# Patient Record
Sex: MALE | Race: WHITE | NOT HISPANIC OR LATINO | Employment: OTHER | URBAN - METROPOLITAN AREA
[De-identification: names, ages, dates, MRNs, and addresses within clinical notes are randomized per-mention and may not be internally consistent; named-entity substitution may affect disease eponyms.]

---

## 2023-03-14 LAB
PROSTATE SPECIFIC AG (NG/ML) IN SER/PLAS: 6.7 NG/ML (ref 0–4)
PROSTATE SPECIFIC AG FREE (NG/ML) IN SER/PLAS: 0.9 NG/ML
PROSTATE SPECIFIC AG FREE/PROSTATE SPECIFIC AG TOTAL IN SER/PLAS: 13 %

## 2023-03-31 ENCOUNTER — TELEPHONE (OUTPATIENT)
Dept: PRIMARY CARE | Facility: CLINIC | Age: 83
End: 2023-03-31
Payer: MEDICARE

## 2023-03-31 NOTE — TELEPHONE ENCOUNTER
Pt. Called back he does see Dr. Askew cardiologist and already sees Dr. Cunha for his lungs, advised we would send reports to both and he will call them next week to see what they want him to do.

## 2023-03-31 NOTE — TELEPHONE ENCOUNTER
----- Message from Christopher Rosado MD sent at 3/29/2023  7:54 AM EDT -----  See if pt still seeing Dr. Askew, cardiologist.  Pt has very high calcium score and may need a stress test.  Dr. Askew would do that.     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 Mari Montoya pulmonary

## 2023-03-31 NOTE — TELEPHONE ENCOUNTER
Result Communication:  for pt. To call again.    Resulted Orders   CT cardiac scoring wo IV contrast    Narrative    Interpreted By:  TATIANA CARTER MD  MRN: [id number]  Patient Name: CHRISTIANE GUARDADO     STUDY:  CT CARDIAC SCORING;  3/28/2023 7:22 am     INDICATION:  hx osteopenia, prostate cancer  M85.80: Osteopenia Z13.6: Encounter  for screening for cardiovascular disorders.     COMPARISON:  None.     ACCESSION NUMBER(S):  [id number]     ORDERING CLINICIAN:  ASHLEY ROSADO     TECHNIQUE:  Using prospective ECG gating, CT scan of the coronary arteries was  performed without intravenous contrast. Coronary calcium scoring  was  performed according to the method of Agatston.     FINDINGS:  The score and distribution of calcium in the coronary arteries is as  follows:     LM 0,  .48.,  LCx 148.58,  .17.,     Total 1217.23     The visualized ascending thoracic aorta measures 4.0 cm in diameter.  The heart is normal in size. No pericardial effusion is present.     No gross evidence of mediastinal or hilar lymphadenopathy or masses  is identified. The visualized segments of the lungs demonstrates  extensive emphysema with subpleural reticulation with prominent  subpleural dystrophic/dendritic calcification..     The main pulmonary artery, right and left pulmonary artery are normal  in size.     The visualized subdiaphragmatic structures appear intact.       Impression    1. Coronary artery calcium score of 1217.23. Given patient's  extremely high coronary artery calcium score consider correlation  with physiologic or anatomic coronary artery testing.*. Extensive  emphysema with subpleural reticulation and calcification. This may  represent smoking-related fibrosis, correlate concern for chronic  interstitial fibrosing process such as fibrotic NSIP.  2. 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     COMMUNICATION  A critical result communication was sent to Dr. Rosado on  03/28/2023 at 8:56 p.m.  using the [employer] system.     *Coronary Artery  Agatston score     Score  risk  Very low 1-99  Mildly increased 100-299  Moderately increased >300  Moderate to severely increased >800     Yoel et al. JCCT 2016 (http://dx.doi.org/10.1016/j.jcct.2016.11.003)     STEVEN 10-Year CHD Risk with Coronary Artery Calcification can be  calcuate using link below  [email or web address]  Chastity et al. JACC 2015 (http://dx.doi.org/10.1016/j.j  acc.2015.08.035)       1:46 PM

## 2023-04-10 ENCOUNTER — TELEPHONE (OUTPATIENT)
Dept: PRIMARY CARE | Facility: CLINIC | Age: 83
End: 2023-04-10
Payer: MEDICARE

## 2023-04-10 ENCOUNTER — PATIENT OUTREACH (OUTPATIENT)
Dept: PRIMARY CARE | Facility: CLINIC | Age: 83
End: 2023-04-10
Payer: MEDICARE

## 2023-04-10 DIAGNOSIS — J18.9 COMMUNITY ACQUIRED PNEUMONIA, UNSPECIFIED LATERALITY: ICD-10-CM

## 2023-04-10 RX ORDER — DOFETILIDE 0.25 MG/1
250 CAPSULE ORAL 2 TIMES DAILY
COMMUNITY
End: 2023-09-27

## 2023-04-10 RX ORDER — LEVOFLOXACIN 750 MG/1
7 TABLET ORAL DAILY
COMMUNITY
Start: 2023-04-09 | End: 2023-06-06 | Stop reason: ALTCHOICE

## 2023-04-10 RX ORDER — ALBUTEROL SULFATE 90 UG/1
2 AEROSOL, METERED RESPIRATORY (INHALATION) EVERY 4 HOURS PRN
COMMUNITY
Start: 2021-11-09

## 2023-04-10 RX ORDER — APIXABAN 5 MG/1
5 TABLET, FILM COATED ORAL 2 TIMES DAILY
COMMUNITY

## 2023-04-10 RX ORDER — BENAZEPRIL HYDROCHLORIDE 5 MG/1
5 TABLET ORAL DAILY
COMMUNITY

## 2023-04-10 RX ORDER — PRIMIDONE 50 MG/1
50 TABLET ORAL 2 TIMES DAILY
COMMUNITY
Start: 2014-09-25 | End: 2024-01-02

## 2023-04-10 NOTE — TELEPHONE ENCOUNTER
SKIP from Pt :  Pt states he was in the hospital Friday and Saturday being treated for Pneumonia.  He was sent home with Levofloxacin, which will be finished on Saturday.

## 2023-04-10 NOTE — PROGRESS NOTES
Discharge Facility: [facility]  Discharge Diagnosis: CAP, Sepsis  Admission Date: 4/7/23  Discharge Date: 4/9/23    PCP appt: Not scheduled- will call back  Engagement  Call Start Time: 1500 (4/10/2023  3:18 PM)    Medications  Medications reviewed with patient/caregiver?: Yes (4/10/2023  3:18 PM)  Is the patient having any side effects they believe may be caused by any medication additions or changes?: No (4/10/2023  3:18 PM)  Does the patient have all medications ordered at discharge?: Yes (4/10/2023  3:18 PM)  Care Management Interventions: No intervention needed (4/10/2023  3:18 PM)  Is the patient taking all medications as directed (includes completed medication regime)?: Yes (4/10/2023  3:18 PM)  Care Management Interventions: Provided patient education (4/10/2023  3:18 PM)    Appointments  Does the patient have a primary care provider?: Yes (4/10/2023  3:18 PM)  Care Management Interventions: Educated patient on importance of making appointment; Advised patient to make appointment (4/10/2023  3:18 PM)  Has the patient kept scheduled appointments due by today?: Not applicable (4/10/2023  3:18 PM)  Care Management Interventions: Educated on importance of keeping appointment (4/10/2023  3:18 PM)  Follow Up Tasks: Appointments (4/10/2023  3:18 PM)    Self Management  What is the home health agency?: N/A (4/10/2023  3:18 PM)  Has home health visited the patient within 72 hours of discharge?: Not applicable (4/10/2023  3:18 PM)  What Durable Medical Equipment (DME) was ordered?: N/A (4/10/2023  3:18 PM)    Patient Teaching  Does the patient have access to their discharge instructions?: Yes (4/10/2023  3:18 PM)  Care Management Interventions: Reviewed instructions with patient (4/10/2023  3:18 PM)  What is the patient's perception of their health status since discharge?: Same (4/10/2023  3:18 PM)  Is the patient/caregiver able to teach back the hierarchy of who to call/visit for symptoms/problems? PCP, Specialist, Home  Health nurse, Urgent Care, ED, 911: Yes (4/10/2023  3:18 PM)    Wrap Up  Wrap Up Additional Comments: Patient states he is home and recovering well with no acute changes or concerns to report at this time. Patient has his wife present who assists with ADLs if needed. Patient denied need for DME or Home health services. Patient states he has no questions or concerns regarding his recent hospitalization or diagnosis'. (4/10/2023  3:18 PM)  Call End Time: 1514 (4/10/2023  3:18 PM)     Xena Lewis

## 2023-04-20 NOTE — TELEPHONE ENCOUNTER
LVMM for pt to call and update how he is feeling, and schedule follow-up next week for hospital stay.

## 2023-04-25 ENCOUNTER — PATIENT OUTREACH (OUTPATIENT)
Dept: PRIMARY CARE | Facility: CLINIC | Age: 83
End: 2023-04-25
Payer: MEDICARE

## 2023-04-25 DIAGNOSIS — J18.9 COMMUNITY ACQUIRED PNEUMONIA, UNSPECIFIED LATERALITY: ICD-10-CM

## 2023-04-25 NOTE — PROGRESS NOTES
Spoke to patient 14 days post discharge from the hospital on 4/9/23 for CAP. Patient states he is feeling well with no acute changes or concerns to report at this time. Patient states he had a stress test done yesterday and has a PFT soon. Patient did not follow up with his PCP within the 14 day window with an appt scheduled for 5/6/23. Patient denied any further questions or concerns at this time. Xena Lewis

## 2023-05-12 ENCOUNTER — PATIENT OUTREACH (OUTPATIENT)
Dept: PRIMARY CARE | Facility: CLINIC | Age: 83
End: 2023-05-12
Payer: MEDICARE

## 2023-05-12 DIAGNOSIS — J18.9 COMMUNITY ACQUIRED PNEUMONIA, UNSPECIFIED LATERALITY: ICD-10-CM

## 2023-06-06 ENCOUNTER — OFFICE VISIT (OUTPATIENT)
Dept: PRIMARY CARE | Facility: CLINIC | Age: 83
End: 2023-06-06
Payer: MEDICARE

## 2023-06-06 VITALS
DIASTOLIC BLOOD PRESSURE: 65 MMHG | SYSTOLIC BLOOD PRESSURE: 128 MMHG | BODY MASS INDEX: 31.95 KG/M2 | HEART RATE: 51 BPM | OXYGEN SATURATION: 97 % | HEIGHT: 74 IN | WEIGHT: 249 LBS

## 2023-06-06 DIAGNOSIS — M85.852 OSTEOPENIA OF NECK OF LEFT FEMUR: ICD-10-CM

## 2023-06-06 DIAGNOSIS — I10 BENIGN ESSENTIAL HYPERTENSION: ICD-10-CM

## 2023-06-06 DIAGNOSIS — G47.33 OBSTRUCTIVE SLEEP APNEA: Primary | ICD-10-CM

## 2023-06-06 DIAGNOSIS — H61.23 BILATERAL IMPACTED CERUMEN: ICD-10-CM

## 2023-06-06 DIAGNOSIS — I48.0 PAROXYSMAL ATRIAL FIBRILLATION (MULTI): ICD-10-CM

## 2023-06-06 PROBLEM — M85.80 OSTEOPENIA: Status: ACTIVE | Noted: 2023-06-06

## 2023-06-06 PROBLEM — K21.9 ESOPHAGEAL REFLUX: Status: ACTIVE | Noted: 2023-06-06

## 2023-06-06 PROBLEM — C61 PROSTATE CANCER (MULTI): Status: ACTIVE | Noted: 2023-06-06

## 2023-06-06 PROBLEM — J45.40 MODERATE PERSISTENT ASTHMA WITHOUT COMPLICATION (HHS-HCC): Status: ACTIVE | Noted: 2023-06-06

## 2023-06-06 PROBLEM — D64.9 CHRONIC ANEMIA: Status: ACTIVE | Noted: 2023-06-06

## 2023-06-06 PROBLEM — E78.2 COMBINED HYPERLIPIDEMIA: Status: ACTIVE | Noted: 2023-06-06

## 2023-06-06 PROBLEM — E53.8 VITAMIN B12 DEFICIENCY: Status: ACTIVE | Noted: 2023-06-06

## 2023-06-06 PROBLEM — E03.8 SUBCLINICAL HYPOTHYROIDISM: Status: ACTIVE | Noted: 2023-06-06

## 2023-06-06 PROBLEM — G25.0 BENIGN ESSENTIAL TREMOR: Status: ACTIVE | Noted: 2023-06-06

## 2023-06-06 PROCEDURE — 3074F SYST BP LT 130 MM HG: CPT | Performed by: FAMILY MEDICINE

## 2023-06-06 PROCEDURE — 99214 OFFICE O/P EST MOD 30 MIN: CPT | Performed by: FAMILY MEDICINE

## 2023-06-06 PROCEDURE — 3078F DIAST BP <80 MM HG: CPT | Performed by: FAMILY MEDICINE

## 2023-06-06 PROCEDURE — 1159F MED LIST DOCD IN RCRD: CPT | Performed by: FAMILY MEDICINE

## 2023-06-06 PROCEDURE — 1036F TOBACCO NON-USER: CPT | Performed by: FAMILY MEDICINE

## 2023-06-06 RX ORDER — BISACODYL 5 MG/1
1 TABLET, COATED ORAL DAILY
COMMUNITY
End: 2023-11-14 | Stop reason: SDUPTHER

## 2023-06-06 RX ORDER — FLUTICASONE PROPIONATE 50 MCG
1 SPRAY, SUSPENSION (ML) NASAL DAILY
COMMUNITY

## 2023-06-06 RX ORDER — SYRINGE-NEEDLE,INSULIN,0.5 ML 28GX1/2"
SYRINGE, EMPTY DISPOSABLE MISCELLANEOUS
COMMUNITY
Start: 2023-04-09 | End: 2023-11-14 | Stop reason: SDUPTHER

## 2023-06-06 RX ORDER — SILDENAFIL 25 MG/1
TABLET, FILM COATED ORAL
COMMUNITY
Start: 2021-11-09

## 2023-06-06 ASSESSMENT — ENCOUNTER SYMPTOMS
UNEXPECTED WEIGHT CHANGE: 0
VOMITING: 0
PALPITATIONS: 0
CONSTIPATION: 0

## 2023-06-06 NOTE — PROGRESS NOTES
"Subjective   Patient ID: Artur Rosa is a 82 y.o. male who presents for Follow-up (Thinks he may have a hernia mid abdomen/Can't bend down to pick things up hurts base of back and has pain in R buttocks, would like to see an orthopedist/Review bone density done in Dec. ).    HPI   Is finally over his pneumonia and very glad for it, got a good report from pulmonology yesterday  Concerned about lump in the middle of his stomach when he sits up has been present his whole life  Has a bump in the right buttock area that is only present when he first gets out of bed in the morning and is painful, also has trouble bending over to put tea in the ground when golfing  Concerned about his neck bone density and does not realize it is referring to the femoral neck  Review of Systems   Constitutional:  Negative for unexpected weight change.   HENT:  Negative for congestion and ear discharge.    Cardiovascular:  Negative for chest pain and palpitations.   Gastrointestinal:  Negative for constipation and vomiting.   All other systems reviewed and are negative.      Objective   /66   Pulse 51   Ht 1.88 m (6' 2\")   Wt 113 kg (249 lb)   SpO2 97%   BMI 31.97 kg/m²     Physical Exam  HENT:      Head: Normocephalic and atraumatic.      Nose: Nose normal.      Mouth/Throat:      Mouth: Mucous membranes are moist.      Pharynx: No oropharyngeal exudate.   Eyes:      Extraocular Movements: Extraocular movements intact.      Conjunctiva/sclera: Conjunctivae normal.      Pupils: Pupils are equal, round, and reactive to light.   Cardiovascular:      Rate and Rhythm: Normal rate and regular rhythm.   Pulmonary:      Effort: Pulmonary effort is normal.   Abdominal:      General: There is no distension.      Palpations: Abdomen is soft.   Musculoskeletal:      Cervical back: Normal range of motion and neck supple.   Lymphadenopathy:      Cervical: No cervical adenopathy.   Neurological:      General: No focal deficit present.      " Mental Status: He is alert.   Psychiatric:         Attention and Perception: Attention normal.         Speech: Speech normal.         Behavior: Behavior is cooperative.     Bilateral cerumen impaction with hearing aids and place, cerumen spoon and irrigated with subsequent exams normal    Assessment/Plan   Diagnoses and all orders for this visit:  Obstructive sleep apnea  Comments:  Continue to use CPAP  Benign essential hypertension  Comments:  Controlled  Paroxysmal atrial fibrillation (CMS/HCC)  Comments:  Rate controlled and anticoagulated  Osteopenia of neck of left femur  Comments:  Discussed location of femoral neck recommend vitamin D supplementation and regular exercise  Repeat bone scan in 2 years  Bilateral impacted cerumen  Comments:  Treated as above    Wife is present  Reviewed labs and DEXA scan result  Recheck 5 to 6 months sooner if any problems arise

## 2023-07-19 ENCOUNTER — OFFICE VISIT (OUTPATIENT)
Dept: PRIMARY CARE | Facility: CLINIC | Age: 83
End: 2023-07-19
Payer: MEDICARE

## 2023-07-19 VITALS
HEART RATE: 58 BPM | OXYGEN SATURATION: 97 % | SYSTOLIC BLOOD PRESSURE: 126 MMHG | TEMPERATURE: 98.2 F | DIASTOLIC BLOOD PRESSURE: 60 MMHG

## 2023-07-19 DIAGNOSIS — J44.1 COPD EXACERBATION (MULTI): Primary | ICD-10-CM

## 2023-07-19 DIAGNOSIS — I10 BENIGN ESSENTIAL HYPERTENSION: ICD-10-CM

## 2023-07-19 PROCEDURE — 1159F MED LIST DOCD IN RCRD: CPT | Performed by: FAMILY MEDICINE

## 2023-07-19 PROCEDURE — 1125F AMNT PAIN NOTED PAIN PRSNT: CPT | Performed by: FAMILY MEDICINE

## 2023-07-19 PROCEDURE — 1160F RVW MEDS BY RX/DR IN RCRD: CPT | Performed by: FAMILY MEDICINE

## 2023-07-19 PROCEDURE — 3078F DIAST BP <80 MM HG: CPT | Performed by: FAMILY MEDICINE

## 2023-07-19 PROCEDURE — 99213 OFFICE O/P EST LOW 20 MIN: CPT | Performed by: FAMILY MEDICINE

## 2023-07-19 PROCEDURE — 1036F TOBACCO NON-USER: CPT | Performed by: FAMILY MEDICINE

## 2023-07-19 PROCEDURE — 3074F SYST BP LT 130 MM HG: CPT | Performed by: FAMILY MEDICINE

## 2023-07-19 RX ORDER — MULTIVIT-MIN/FA/LYCOPEN/LUTEIN .4-300-25
1 TABLET ORAL DAILY
COMMUNITY
Start: 2014-03-31

## 2023-07-19 RX ORDER — PREDNISONE 10 MG/1
TABLET ORAL
Qty: 14 TABLET | Refills: 0 | Status: SHIPPED | OUTPATIENT
Start: 2023-07-19 | End: 2023-07-27

## 2023-07-19 RX ORDER — DOXYCYCLINE 100 MG/1
100 CAPSULE ORAL 2 TIMES DAILY
Qty: 14 CAPSULE | Refills: 0 | Status: SHIPPED | OUTPATIENT
Start: 2023-07-19 | End: 2023-07-26

## 2023-07-19 ASSESSMENT — ENCOUNTER SYMPTOMS
UNEXPECTED WEIGHT CHANGE: 0
CONSTIPATION: 0
PALPITATIONS: 0
VOMITING: 0

## 2023-07-19 NOTE — PROGRESS NOTES
Subjective   Patient ID: Artur Rosa is a 82 y.o. male who presents for chilled (Started last night, coughing since last week and feels tired, denies ST, fever or earaches, cough sounds deep and moist per wife mucus is white today. ).    HPI   No shortness of breath but not feeling very well this morning but feels better this afternoon  Sputum is white  Did smoke for 40 years and says he has lung damage at the base of each lung  Review of Systems   Constitutional:  Negative for unexpected weight change.   HENT:  Negative for congestion and ear discharge.    Cardiovascular:  Negative for chest pain and palpitations.   Gastrointestinal:  Negative for constipation and vomiting.   All other systems reviewed and are negative.      Objective   /60   Pulse 58   Temp 36.8 °C (98.2 °F)   SpO2 97%     Physical Exam  No apparent distress, alert and oriented.  HEENT TMs clear.  Nose mild nasal discharge.  Pharynx mild injection.  Neck supple, shoddy anterior lymphadenopathy.  Lungs clear to auscultation except slight rhonchi with cough.  Heart regular rate and rhythm .  Abdomen soft, nontender   Assessment/Plan   Diagnoses and all orders for this visit:  COPD exacerbation (CMS/[facility])  -     predniSONE (Deltasone) 10 mg tablet; Take 1 tablet (10 mg) by mouth 3 times a day for 2 days, THEN 1 tablet (10 mg) 2 times a day for 2 days, THEN 1 tablet (10 mg) once daily for 4 days.  -     doxycycline (Vibramycin) 100 mg capsule; Take 1 capsule (100 mg) by mouth 2 times a day for 7 days. Take with at least 8 ounces (large glass) of water, do not lie down for 30 minutes after  Benign essential hypertension  Comments:  controlled     Risks, benefits of medication discussed.  Add medications as directed.  Push fluids, supportive care.  Discussed diagnosis and treatment length . RTC 1 week if not improved, sooner if worse.   Wife is present

## 2023-07-24 ENCOUNTER — PATIENT OUTREACH (OUTPATIENT)
Dept: PRIMARY CARE | Facility: CLINIC | Age: 83
End: 2023-07-24
Payer: MEDICARE

## 2023-07-24 NOTE — PROGRESS NOTES
Unsuccessful outreach to this patient for the 90 day post discharge outreach. Left a voice-mail message including my direct call back number for the patient to call regarding any questions or concerns.  Patient has met target of no readmissions for 90 days and has graduated from [facility]

## 2023-09-06 LAB — PROSTATE SPECIFIC AG (NG/ML) IN SER/PLAS: 6.42 NG/ML (ref 0–4)

## 2023-09-27 DIAGNOSIS — I48.0 PAROXYSMAL ATRIAL FIBRILLATION (MULTI): Primary | ICD-10-CM

## 2023-09-27 PROBLEM — Z85.828 PERSONAL HISTORY OF OTHER MALIGNANT NEOPLASM OF SKIN: Status: ACTIVE | Noted: 2023-02-01

## 2023-09-27 PROBLEM — J44.1 COPD EXACERBATION (MULTI): Status: ACTIVE | Noted: 2023-09-27

## 2023-09-27 PROBLEM — Z85.820 PERSONAL HISTORY OF MALIGNANT MELANOMA OF SKIN: Status: ACTIVE | Noted: 2023-02-01

## 2023-09-27 PROBLEM — L82.0 INFLAMED SEBORRHEIC KERATOSIS: Status: ACTIVE | Noted: 2023-02-01

## 2023-09-27 PROBLEM — R97.20 ELEVATED PROSTATE SPECIFIC ANTIGEN (PSA): Status: ACTIVE | Noted: 2023-09-27

## 2023-09-27 PROBLEM — K22.4 ESOPHAGEAL DYSMOTILITY: Status: ACTIVE | Noted: 2023-09-27

## 2023-09-27 PROBLEM — L81.4 OTHER MELANIN HYPERPIGMENTATION: Status: ACTIVE | Noted: 2023-02-01

## 2023-09-27 PROBLEM — D22.30 MELANOCYTIC NEVI OF UNSPECIFIED PART OF FACE: Status: ACTIVE | Noted: 2023-02-01

## 2023-09-27 PROBLEM — L85.3 XEROSIS CUTIS: Status: ACTIVE | Noted: 2023-02-01

## 2023-09-27 PROBLEM — L82.1 OTHER SEBORRHEIC KERATOSIS: Status: ACTIVE | Noted: 2023-02-01

## 2023-09-27 PROBLEM — L91.8 OTHER HYPERTROPHIC DISORDERS OF THE SKIN: Status: ACTIVE | Noted: 2023-02-01

## 2023-09-27 PROBLEM — D22.70 MELANOCYTIC NEVI OF UNSPECIFIED LOWER LIMB, INCLUDING HIP: Status: ACTIVE | Noted: 2023-02-01

## 2023-09-27 PROBLEM — D22.5 MELANOCYTIC NEVI OF TRUNK: Status: ACTIVE | Noted: 2023-02-01

## 2023-09-27 PROBLEM — D23.9 OTHER BENIGN NEOPLASM OF SKIN, UNSPECIFIED: Status: ACTIVE | Noted: 2023-02-01

## 2023-09-27 PROBLEM — L73.8 OTHER SPECIFIED FOLLICULAR DISORDERS: Status: ACTIVE | Noted: 2023-02-01

## 2023-09-27 PROBLEM — L90.5 SCAR CONDITION AND FIBROSIS OF SKIN: Status: ACTIVE | Noted: 2023-02-01

## 2023-09-27 PROBLEM — D22.60 MELANOCYTIC NEVI OF UNSPECIFIED UPPER LIMB, INCLUDING SHOULDER: Status: ACTIVE | Noted: 2023-02-01

## 2023-09-27 PROBLEM — D18.01 HEMANGIOMA OF SKIN AND SUBCUTANEOUS TISSUE: Status: ACTIVE | Noted: 2023-02-01

## 2023-09-27 PROBLEM — C43.59 MALIGNANT MELANOMA OF OTHER PART OF TRUNK (MULTI): Status: ACTIVE | Noted: 2023-02-01

## 2023-09-27 PROBLEM — C44.41 BASAL CELL CARCINOMA OF SKIN OF SCALP AND NECK: Status: ACTIVE | Noted: 2023-02-01

## 2023-09-27 PROBLEM — N52.9 ED (ERECTILE DYSFUNCTION): Status: ACTIVE | Noted: 2023-09-27

## 2023-09-27 RX ORDER — NYSTATIN 100000 U/G
CREAM TOPICAL
COMMUNITY
Start: 2015-10-16 | End: 2024-02-19 | Stop reason: WASHOUT

## 2023-09-27 RX ORDER — DOFETILIDE 0.25 MG/1
CAPSULE ORAL 2 TIMES DAILY
Qty: 180 CAPSULE | Refills: 1 | Status: SHIPPED | OUTPATIENT
Start: 2023-09-27 | End: 2024-03-18

## 2023-09-27 RX ORDER — AMMONIUM LACTATE 12 G/100G
CREAM TOPICAL
COMMUNITY
Start: 2015-10-16 | End: 2024-02-19 | Stop reason: WASHOUT

## 2023-11-14 ENCOUNTER — LAB (OUTPATIENT)
Dept: LAB | Facility: LAB | Age: 83
End: 2023-11-14
Payer: MEDICARE

## 2023-11-14 ENCOUNTER — OFFICE VISIT (OUTPATIENT)
Dept: PRIMARY CARE | Facility: CLINIC | Age: 83
End: 2023-11-14
Payer: MEDICARE

## 2023-11-14 VITALS
DIASTOLIC BLOOD PRESSURE: 62 MMHG | WEIGHT: 251 LBS | BODY MASS INDEX: 32.21 KG/M2 | HEIGHT: 74 IN | OXYGEN SATURATION: 99 % | SYSTOLIC BLOOD PRESSURE: 120 MMHG | HEART RATE: 62 BPM

## 2023-11-14 DIAGNOSIS — E78.2 COMBINED HYPERLIPIDEMIA: ICD-10-CM

## 2023-11-14 DIAGNOSIS — I48.0 PAROXYSMAL ATRIAL FIBRILLATION (MULTI): ICD-10-CM

## 2023-11-14 DIAGNOSIS — I10 BENIGN ESSENTIAL HYPERTENSION: ICD-10-CM

## 2023-11-14 DIAGNOSIS — Z00.00 ROUTINE GENERAL MEDICAL EXAMINATION AT HEALTH CARE FACILITY: ICD-10-CM

## 2023-11-14 DIAGNOSIS — I10 BENIGN ESSENTIAL HYPERTENSION: Primary | ICD-10-CM

## 2023-11-14 LAB
ALBUMIN SERPL BCP-MCNC: 3.8 G/DL (ref 3.4–5)
ALP SERPL-CCNC: 56 U/L (ref 33–136)
ALT SERPL W P-5'-P-CCNC: 19 U/L (ref 10–52)
ANION GAP SERPL CALC-SCNC: 11 MMOL/L (ref 10–20)
AST SERPL W P-5'-P-CCNC: 19 U/L (ref 9–39)
BASOPHILS # BLD AUTO: 0.02 X10*3/UL (ref 0–0.1)
BASOPHILS NFR BLD AUTO: 0.4 %
BILIRUB SERPL-MCNC: 0.3 MG/DL (ref 0–1.2)
BUN SERPL-MCNC: 19 MG/DL (ref 6–23)
CALCIUM SERPL-MCNC: 8.4 MG/DL (ref 8.6–10.3)
CHLORIDE SERPL-SCNC: 105 MMOL/L (ref 98–107)
CHOLEST SERPL-MCNC: 143 MG/DL (ref 0–199)
CHOLESTEROL/HDL RATIO: 3.3
CO2 SERPL-SCNC: 28 MMOL/L (ref 21–32)
CREAT SERPL-MCNC: 1.14 MG/DL (ref 0.5–1.3)
EOSINOPHIL # BLD AUTO: 0.09 X10*3/UL (ref 0–0.4)
EOSINOPHIL NFR BLD AUTO: 1.6 %
ERYTHROCYTE [DISTWIDTH] IN BLOOD BY AUTOMATED COUNT: 13.7 % (ref 11.5–14.5)
GFR SERPL CREATININE-BSD FRML MDRD: 63 ML/MIN/1.73M*2
GLUCOSE SERPL-MCNC: 88 MG/DL (ref 74–99)
HCT VFR BLD AUTO: 36.8 % (ref 41–52)
HDLC SERPL-MCNC: 43.2 MG/DL
HGB BLD-MCNC: 11.8 G/DL (ref 13.5–17.5)
IMM GRANULOCYTES # BLD AUTO: 0.01 X10*3/UL (ref 0–0.5)
IMM GRANULOCYTES NFR BLD AUTO: 0.2 % (ref 0–0.9)
LYMPHOCYTES # BLD AUTO: 2.32 X10*3/UL (ref 0.8–3)
LYMPHOCYTES NFR BLD AUTO: 40.8 %
MCH RBC QN AUTO: 32.2 PG (ref 26–34)
MCHC RBC AUTO-ENTMCNC: 32.1 G/DL (ref 32–36)
MCV RBC AUTO: 101 FL (ref 80–100)
MONOCYTES # BLD AUTO: 0.65 X10*3/UL (ref 0.05–0.8)
MONOCYTES NFR BLD AUTO: 11.4 %
NEUTROPHILS # BLD AUTO: 2.6 X10*3/UL (ref 1.6–5.5)
NEUTROPHILS NFR BLD AUTO: 45.6 %
NON-HDL CHOLESTEROL: 100 MG/DL (ref 0–149)
NRBC BLD-RTO: 0 /100 WBCS (ref 0–0)
PLATELET # BLD AUTO: 181 X10*3/UL (ref 150–450)
POTASSIUM SERPL-SCNC: 4.2 MMOL/L (ref 3.5–5.3)
PROT SERPL-MCNC: 6.8 G/DL (ref 6.4–8.2)
RBC # BLD AUTO: 3.66 X10*6/UL (ref 4.5–5.9)
SODIUM SERPL-SCNC: 140 MMOL/L (ref 136–145)
WBC # BLD AUTO: 5.7 X10*3/UL (ref 4.4–11.3)

## 2023-11-14 PROCEDURE — G0439 PPPS, SUBSEQ VISIT: HCPCS | Performed by: FAMILY MEDICINE

## 2023-11-14 PROCEDURE — 99214 OFFICE O/P EST MOD 30 MIN: CPT | Performed by: FAMILY MEDICINE

## 2023-11-14 PROCEDURE — 1170F FXNL STATUS ASSESSED: CPT | Performed by: FAMILY MEDICINE

## 2023-11-14 PROCEDURE — 1160F RVW MEDS BY RX/DR IN RCRD: CPT | Performed by: FAMILY MEDICINE

## 2023-11-14 PROCEDURE — 3078F DIAST BP <80 MM HG: CPT | Performed by: FAMILY MEDICINE

## 2023-11-14 PROCEDURE — 1159F MED LIST DOCD IN RCRD: CPT | Performed by: FAMILY MEDICINE

## 2023-11-14 PROCEDURE — 1125F AMNT PAIN NOTED PAIN PRSNT: CPT | Performed by: FAMILY MEDICINE

## 2023-11-14 PROCEDURE — 3074F SYST BP LT 130 MM HG: CPT | Performed by: FAMILY MEDICINE

## 2023-11-14 PROCEDURE — 1036F TOBACCO NON-USER: CPT | Performed by: FAMILY MEDICINE

## 2023-11-14 PROCEDURE — 85025 COMPLETE CBC W/AUTO DIFF WBC: CPT

## 2023-11-14 PROCEDURE — 80053 COMPREHEN METABOLIC PANEL: CPT

## 2023-11-14 PROCEDURE — 36415 COLL VENOUS BLD VENIPUNCTURE: CPT

## 2023-11-14 PROCEDURE — 83718 ASSAY OF LIPOPROTEIN: CPT

## 2023-11-14 PROCEDURE — 82465 ASSAY BLD/SERUM CHOLESTEROL: CPT

## 2023-11-14 ASSESSMENT — ENCOUNTER SYMPTOMS
OCCASIONAL FEELINGS OF UNSTEADINESS: 0
DEPRESSION: 0
VOMITING: 0
PALPITATIONS: 0
LOSS OF SENSATION IN FEET: 0
CONSTIPATION: 0
UNEXPECTED WEIGHT CHANGE: 0

## 2023-11-14 ASSESSMENT — COLUMBIA-SUICIDE SEVERITY RATING SCALE - C-SSRS
1. IN THE PAST MONTH, HAVE YOU WISHED YOU WERE DEAD OR WISHED YOU COULD GO TO SLEEP AND NOT WAKE UP?: NO
2. HAVE YOU ACTUALLY HAD ANY THOUGHTS OF KILLING YOURSELF?: NO
6. HAVE YOU EVER DONE ANYTHING, STARTED TO DO ANYTHING, OR PREPARED TO DO ANYTHING TO END YOUR LIFE?: NO

## 2023-11-14 ASSESSMENT — ACTIVITIES OF DAILY LIVING (ADL)
TAKING_MEDICATION: INDEPENDENT
BATHING: INDEPENDENT
DRESSING: INDEPENDENT
GROCERY_SHOPPING: INDEPENDENT
MANAGING_FINANCES: INDEPENDENT
DOING_HOUSEWORK: INDEPENDENT

## 2023-11-14 ASSESSMENT — PATIENT HEALTH QUESTIONNAIRE - PHQ9
2. FEELING DOWN, DEPRESSED OR HOPELESS: NOT AT ALL
SUM OF ALL RESPONSES TO PHQ9 QUESTIONS 1 AND 2: 0
1. LITTLE INTEREST OR PLEASURE IN DOING THINGS: NOT AT ALL

## 2023-11-14 NOTE — PROGRESS NOTES
"Subjective   Patient ID: Artur Rosa is a 83 y.o. male who presents for Follow-up (5 month) and Medicare Annual Wellness Visit Initial.    HPI   Patient has hx of stable AF, hypertension, hyperlipidemia.  Pt denies chest pain, shortness of breath and edema.  Patient's current treatment as listed in Rx.  Patient is compliant with treatment and complains of no side effects associated treatment.    Review of Systems   Constitutional:  Negative for unexpected weight change.   HENT:  Negative for congestion and ear discharge.    Cardiovascular:  Negative for chest pain and palpitations.   Gastrointestinal:  Negative for constipation and vomiting.   All other systems reviewed and are negative.      Objective   /62   Pulse 62   Ht 1.88 m (6' 2\")   Wt 114 kg (251 lb)   SpO2 99%   BMI 32.23 kg/m²     Physical Exam  HENT:      Head: Normocephalic and atraumatic.      Nose: Nose normal.      Mouth/Throat:      Mouth: Mucous membranes are moist.      Pharynx: No oropharyngeal exudate.   Eyes:      Extraocular Movements: Extraocular movements intact.      Conjunctiva/sclera: Conjunctivae normal.      Pupils: Pupils are equal, round, and reactive to light.   Cardiovascular:      Rate and Rhythm: Normal rate and regular rhythm.   Pulmonary:      Effort: Pulmonary effort is normal.   Abdominal:      General: There is no distension.      Palpations: Abdomen is soft.   Musculoskeletal:      Cervical back: Normal range of motion and neck supple.   Lymphadenopathy:      Cervical: No cervical adenopathy.   Neurological:      General: No focal deficit present.      Mental Status: He is alert.   Psychiatric:         Attention and Perception: Attention normal.         Speech: Speech normal.         Behavior: Behavior is cooperative.         Assessment/Plan   Diagnoses and all orders for this visit:  Benign essential hypertension  Comments:  Treated and controlled  Orders:  -     Comprehensive Metabolic Panel; Future  -     " CBC and Auto Differential; Future  -     Lipid Panel Non-Fasting; Future  Combined hyperlipidemia  Comments:  Low-fat low-cholesterol diet and stay active  Orders:  -     Comprehensive Metabolic Panel; Future  -     CBC and Auto Differential; Future  -     Lipid Panel Non-Fasting; Future  Paroxysmal atrial fibrillation (CMS/HCC)  Comments:  Anticoagulated and rate is controlled  Orders:  -     Comprehensive Metabolic Panel; Future  -     CBC and Auto Differential; Future  -     Lipid Panel Non-Fasting; Future  Routine general medical examination at health care facility   LM discussed  Diet exercise and weight discussed  Wife is present  Recheck 6 months sooner if any issues arise

## 2023-11-16 ENCOUNTER — TELEPHONE (OUTPATIENT)
Dept: PRIMARY CARE | Facility: CLINIC | Age: 83
End: 2023-11-16
Payer: MEDICARE

## 2023-12-16 DIAGNOSIS — G25.0 ESSENTIAL TREMOR: ICD-10-CM

## 2024-01-02 RX ORDER — PRIMIDONE 50 MG/1
50 TABLET ORAL 2 TIMES DAILY
Qty: 180 TABLET | Refills: 1 | Status: SHIPPED | OUTPATIENT
Start: 2024-01-02

## 2024-02-19 ENCOUNTER — OFFICE VISIT (OUTPATIENT)
Dept: NEUROLOGY | Facility: CLINIC | Age: 84
End: 2024-02-19
Payer: MEDICARE

## 2024-02-19 VITALS
WEIGHT: 249 LBS | BODY MASS INDEX: 31.95 KG/M2 | HEART RATE: 56 BPM | SYSTOLIC BLOOD PRESSURE: 140 MMHG | DIASTOLIC BLOOD PRESSURE: 72 MMHG | HEIGHT: 74 IN

## 2024-02-19 DIAGNOSIS — G25.0 BENIGN ESSENTIAL TREMOR: Primary | ICD-10-CM

## 2024-02-19 PROCEDURE — 3077F SYST BP >= 140 MM HG: CPT | Performed by: PSYCHIATRY & NEUROLOGY

## 2024-02-19 PROCEDURE — 99213 OFFICE O/P EST LOW 20 MIN: CPT | Performed by: PSYCHIATRY & NEUROLOGY

## 2024-02-19 PROCEDURE — 1125F AMNT PAIN NOTED PAIN PRSNT: CPT | Performed by: PSYCHIATRY & NEUROLOGY

## 2024-02-19 PROCEDURE — 1036F TOBACCO NON-USER: CPT | Performed by: PSYCHIATRY & NEUROLOGY

## 2024-02-19 PROCEDURE — 3078F DIAST BP <80 MM HG: CPT | Performed by: PSYCHIATRY & NEUROLOGY

## 2024-02-19 PROCEDURE — 1159F MED LIST DOCD IN RCRD: CPT | Performed by: PSYCHIATRY & NEUROLOGY

## 2024-02-19 NOTE — PROGRESS NOTES
Subjective   Artur Rosa is a 83 y.o.   male.  HPI  This is a 83 year old man with BET, last seen in 12/15/22.  He is doing well, no adverse effects, no new medical problems: except a Podiatrist clips his   Toenails, states his toes are numb.  His right sided tremor is about the same.    Objective   Neurological Exam  Normal speech, follows commands.  Mild action tremor in the right upper extremity, normal muscle tone.  Gait is normal- with normal bilateral arm swing.  Physical Exam  I personally reviewed laboratory, radiographic, and medical studies which were pertinent for nothing.    Assessment/Plan

## 2024-03-04 ENCOUNTER — LAB (OUTPATIENT)
Dept: LAB | Facility: LAB | Age: 84
End: 2024-03-04
Payer: MEDICARE

## 2024-03-04 DIAGNOSIS — C61 MALIGNANT NEOPLASM OF PROSTATE (MULTI): Primary | ICD-10-CM

## 2024-03-04 LAB — PSA SERPL-MCNC: 5.79 NG/ML

## 2024-03-04 PROCEDURE — 36415 COLL VENOUS BLD VENIPUNCTURE: CPT

## 2024-03-04 PROCEDURE — 84153 ASSAY OF PSA TOTAL: CPT

## 2024-03-09 DIAGNOSIS — J45.40 MODERATE PERSISTENT ASTHMA, UNCOMPLICATED (HHS-HCC): ICD-10-CM

## 2024-03-11 RX ORDER — BUDESONIDE AND FORMOTEROL FUMARATE DIHYDRATE 80; 4.5 UG/1; UG/1
AEROSOL RESPIRATORY (INHALATION)
Qty: 30.6 G | Refills: 0 | Status: SHIPPED | OUTPATIENT
Start: 2024-03-11 | End: 2024-03-18

## 2024-03-13 ENCOUNTER — OFFICE VISIT (OUTPATIENT)
Dept: UROLOGY | Facility: CLINIC | Age: 84
End: 2024-03-13
Payer: MEDICARE

## 2024-03-13 DIAGNOSIS — R39.11 BENIGN PROSTATIC HYPERPLASIA WITH URINARY HESITANCY: ICD-10-CM

## 2024-03-13 DIAGNOSIS — C61 MALIGNANT NEOPLASM OF PROSTATE (MULTI): Primary | ICD-10-CM

## 2024-03-13 DIAGNOSIS — N40.1 BENIGN PROSTATIC HYPERPLASIA WITH URINARY HESITANCY: ICD-10-CM

## 2024-03-13 PROCEDURE — 1036F TOBACCO NON-USER: CPT | Performed by: STUDENT IN AN ORGANIZED HEALTH CARE EDUCATION/TRAINING PROGRAM

## 2024-03-13 PROCEDURE — 1159F MED LIST DOCD IN RCRD: CPT | Performed by: STUDENT IN AN ORGANIZED HEALTH CARE EDUCATION/TRAINING PROGRAM

## 2024-03-13 PROCEDURE — 99214 OFFICE O/P EST MOD 30 MIN: CPT | Performed by: STUDENT IN AN ORGANIZED HEALTH CARE EDUCATION/TRAINING PROGRAM

## 2024-03-13 RX ORDER — TAMSULOSIN HYDROCHLORIDE 0.4 MG/1
0.4 CAPSULE ORAL DAILY
Qty: 30 CAPSULE | Refills: 11 | Status: SHIPPED | OUTPATIENT
Start: 2024-03-13 | End: 2024-05-06 | Stop reason: SDUPTHER

## 2024-03-13 NOTE — PROGRESS NOTES
Subjective   Patient ID: Artur Rosa is a 83 y.o. male.    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 PSA now at 5.7.       He has urinary urgency and some leaking. Has frequency, sometimes has to go every hour. Had been on medication for  this in the past but does not recall name.      03/14/23 MRI prostate:  1. Prostate weight is estimated at 30.14g. PSA density is 0.22 ng/mL/g.  2. Previously seen PI-RADS 5 lesion in the left mid prostatic  peripheral zone has decreased in conspicuity. There is irregularity  of the left posterolateral prostatic capsule concerning for  extracapsular invasion.  3. PI-RADS 4 lesion in the right mid prostatic anterior transition  zone without evidence of extracapsular extension, similar to prior  exam.      Lab Results   Component Value Date    PSA 5.79 (H) 03/04/2024    PSA 6.42 (H) 09/05/2023    PSA 6.7 (H) 03/10/2023    PSA 4.97 (H) 09/06/2022    PSA 7.1 (H) 06/29/2022    PSA 5.8 (H) 03/07/2022    PSA 6.12 (H) 11/09/2021    PSA 4.5 (H) 05/11/2018       Review of Systems    Objective   Physical Exam    Assessment/Plan   82 yo male on active surveillance for GG1 prostatic adenocarcinoma, Ryan Score 3+3=6. PSA now at 5.7. We will continue observation with next PSA marker, and MRI prostate in 6 months.     MRI prostate from 1 year ago shows similar PI-RADS 4 lesion in the right mid prostatic anterior transition zone without evidence of extracapsular extension. Also, previously seen PI-RADS 5 lesion in the left mid prostatic peripheral zone has decreased in conspicuity. There is irregularity of the left posterolateral prostatic capsule concerning for  extracapsular invasion.      He has urinary urgency and some leaking. Has frequency, sometimes has to go every hour. Had been on medication for  this in the past but does not recall name. We will try tamsulosin. We discussed risks, benefits, alternatives and side effects. He agrees  to try.      He is on AS secondary to age, comorbidities, and biopsy of GG1 with a stable PSA. Reassured patient. Will repeat PSA in September 2024.     Plan:  PSA 6 months   MRI of prostate in 6 months.   Start tamsulosin 0.4  mg 1 pill daily at bedtime.   FUV 2 months to assess improvement of sxs on medication.     Diagnoses and all orders for this visit:  Malignant neoplasm of prostate (CMS/HCC)  Benign prostatic hyperplasia with urinary hesitancy      Scribe Attestation  By signing my name below, IRajni Scribe attest that this documentation has been prepared under the direction and in the presence of Vicki Howard MD.

## 2024-03-16 DIAGNOSIS — I48.0 PAROXYSMAL ATRIAL FIBRILLATION (MULTI): ICD-10-CM

## 2024-03-16 DIAGNOSIS — J45.40 MODERATE PERSISTENT ASTHMA, UNCOMPLICATED (HHS-HCC): ICD-10-CM

## 2024-03-18 RX ORDER — DOFETILIDE 0.25 MG/1
CAPSULE ORAL 2 TIMES DAILY
Qty: 180 CAPSULE | Refills: 1 | Status: SHIPPED | OUTPATIENT
Start: 2024-03-18

## 2024-03-18 RX ORDER — BUDESONIDE AND FORMOTEROL FUMARATE DIHYDRATE 80; 4.5 UG/1; UG/1
AEROSOL RESPIRATORY (INHALATION)
Qty: 10.2 G | Refills: 1 | Status: SHIPPED | OUTPATIENT
Start: 2024-03-18

## 2024-05-06 ENCOUNTER — OFFICE VISIT (OUTPATIENT)
Dept: UROLOGY | Facility: CLINIC | Age: 84
End: 2024-05-06
Payer: MEDICARE

## 2024-05-06 DIAGNOSIS — R39.11 BENIGN PROSTATIC HYPERPLASIA WITH URINARY HESITANCY: ICD-10-CM

## 2024-05-06 DIAGNOSIS — N40.1 BENIGN PROSTATIC HYPERPLASIA WITH URINARY HESITANCY: ICD-10-CM

## 2024-05-06 DIAGNOSIS — C61 MALIGNANT NEOPLASM OF PROSTATE (MULTI): ICD-10-CM

## 2024-05-06 DIAGNOSIS — N40.1 BENIGN PROSTATIC HYPERPLASIA WITH LOWER URINARY TRACT SYMPTOMS, SYMPTOM DETAILS UNSPECIFIED: Primary | ICD-10-CM

## 2024-05-06 PROCEDURE — 1159F MED LIST DOCD IN RCRD: CPT | Performed by: STUDENT IN AN ORGANIZED HEALTH CARE EDUCATION/TRAINING PROGRAM

## 2024-05-06 PROCEDURE — G2211 COMPLEX E/M VISIT ADD ON: HCPCS | Performed by: STUDENT IN AN ORGANIZED HEALTH CARE EDUCATION/TRAINING PROGRAM

## 2024-05-06 PROCEDURE — 99214 OFFICE O/P EST MOD 30 MIN: CPT | Performed by: STUDENT IN AN ORGANIZED HEALTH CARE EDUCATION/TRAINING PROGRAM

## 2024-05-06 RX ORDER — TAMSULOSIN HYDROCHLORIDE 0.4 MG/1
0.8 CAPSULE ORAL DAILY
Qty: 60 CAPSULE | Refills: 11 | Status: SHIPPED | OUTPATIENT
Start: 2024-05-06 | End: 2025-05-06

## 2024-05-06 NOTE — PROGRESS NOTES
Subjective   Patient ID: Artur Rosa is a 83 y.o. male.    HPI  84 yo male on active surveillance for GG1 prostatic adenocarcinoma, Ryan Score 3+3=6. PSA now at 5.7.      He has urinary urgency and some leaking. Has frequency, some improvement on the tamsulosin 0.4 mg but still room for improvement.      03/14/23 MRI prostate:  1. Prostate weight is estimated at 30.14g. PSA density is 0.22 ng/mL/g.  2. Previously seen PI-RADS 5 lesion in the left mid prostatic  peripheral zone has decreased in conspicuity. There is irregularity  of the left posterolateral prostatic capsule concerning for  extracapsular invasion.  3. PI-RADS 4 lesion in the right mid prostatic anterior transition  zone without evidence of extracapsular extension, similar to prior  exam.  Lab Results   Component Value Date    PSA 5.79 (H) 03/04/2024    PSA 6.42 (H) 09/05/2023    PSA 6.7 (H) 03/10/2023    PSA 4.97 (H) 09/06/2022    PSA 7.1 (H) 06/29/2022    PSA 5.8 (H) 03/07/2022    PSA 6.12 (H) 11/09/2021    PSA 4.5 (H) 05/11/2018       Review of Systems    Objective   Physical Exam    Assessment/Plan   84 yo male on active surveillance for GG1 prostatic adenocarcinoma, Maddock Score 3+3=6. PSA now at 5.7.      He has urinary urgency and some leaking. Has frequency, some improvement on the tamsulosin 0.4 mg but still room for improvement.     He is on primidone, interactions reviewed. We discussed increase tamsulosin to double dose. We discussed risks, benefits, alternatives and side effects. We will reassess sxs at next visit.     Plan:  PSA September 2024  Make tamsulosin 0.4  mg 2 pills daily at bedtime.  FUV September 2024.     Diagnoses and all orders for this visit:  Benign prostatic hyperplasia with lower urinary tract symptoms, symptom details unspecified  Malignant neoplasm of prostate (Multi)  Benign prostatic hyperplasia with urinary hesitancy  -     tamsulosin (Flomax) 0.4 mg 24 hr capsule; Take 2 capsules (0.8 mg) by mouth once  daily.    Scribe Attestation  By signing my name below, I, Huey Renae attest that this documentation has been prepared under the direction and in the presence of Vicki Howard MD.

## 2024-06-22 DIAGNOSIS — I48.0 PAROXYSMAL ATRIAL FIBRILLATION (MULTI): ICD-10-CM

## 2024-06-24 RX ORDER — DOFETILIDE 0.25 MG/1
CAPSULE ORAL 2 TIMES DAILY
Qty: 180 CAPSULE | Refills: 1 | Status: SHIPPED | OUTPATIENT
Start: 2024-06-24

## 2024-08-05 ENCOUNTER — HOSPITAL ENCOUNTER (OUTPATIENT)
Dept: RADIOLOGY | Facility: HOSPITAL | Age: 84
Discharge: HOME | End: 2024-08-05
Payer: MEDICARE

## 2024-08-05 DIAGNOSIS — C61 MALIGNANT NEOPLASM OF PROSTATE (MULTI): ICD-10-CM

## 2024-08-05 PROCEDURE — A9575 INJ GADOTERATE MEGLUMI 0.1ML: HCPCS | Performed by: STUDENT IN AN ORGANIZED HEALTH CARE EDUCATION/TRAINING PROGRAM

## 2024-08-05 PROCEDURE — 72197 MRI PELVIS W/O & W/DYE: CPT | Performed by: RADIOLOGY

## 2024-08-05 PROCEDURE — 72197 MRI PELVIS W/O & W/DYE: CPT

## 2024-08-05 PROCEDURE — 2550000001 HC RX 255 CONTRASTS: Performed by: STUDENT IN AN ORGANIZED HEALTH CARE EDUCATION/TRAINING PROGRAM

## 2024-08-05 RX ORDER — GADOTERATE MEGLUMINE 376.9 MG/ML
0.2 INJECTION INTRAVENOUS
Status: COMPLETED | OUTPATIENT
Start: 2024-08-05 | End: 2024-08-05

## 2024-08-05 ASSESSMENT — ENCOUNTER SYMPTOMS
OCCASIONAL FEELINGS OF UNSTEADINESS: 0
LOSS OF SENSATION IN FEET: 0
DEPRESSION: 0

## 2024-08-08 ENCOUNTER — LAB (OUTPATIENT)
Dept: LAB | Facility: LAB | Age: 84
End: 2024-08-08
Payer: MEDICARE

## 2024-08-08 ENCOUNTER — OFFICE VISIT (OUTPATIENT)
Dept: PRIMARY CARE | Facility: CLINIC | Age: 84
End: 2024-08-08
Payer: MEDICARE

## 2024-08-08 ENCOUNTER — TELEPHONE (OUTPATIENT)
Dept: PRIMARY CARE | Facility: CLINIC | Age: 84
End: 2024-08-08

## 2024-08-08 VITALS
OXYGEN SATURATION: 95 % | WEIGHT: 257 LBS | HEART RATE: 75 BPM | SYSTOLIC BLOOD PRESSURE: 120 MMHG | BODY MASS INDEX: 32.98 KG/M2 | DIASTOLIC BLOOD PRESSURE: 60 MMHG | TEMPERATURE: 97.9 F

## 2024-08-08 DIAGNOSIS — C61 MALIGNANT NEOPLASM OF PROSTATE (MULTI): ICD-10-CM

## 2024-08-08 DIAGNOSIS — J01.00 ACUTE MAXILLARY SINUSITIS, RECURRENCE NOT SPECIFIED: Primary | ICD-10-CM

## 2024-08-08 DIAGNOSIS — I10 BENIGN ESSENTIAL HYPERTENSION: ICD-10-CM

## 2024-08-08 PROCEDURE — 1160F RVW MEDS BY RX/DR IN RCRD: CPT | Performed by: FAMILY MEDICINE

## 2024-08-08 PROCEDURE — 36415 COLL VENOUS BLD VENIPUNCTURE: CPT

## 2024-08-08 PROCEDURE — 1159F MED LIST DOCD IN RCRD: CPT | Performed by: FAMILY MEDICINE

## 2024-08-08 PROCEDURE — 1036F TOBACCO NON-USER: CPT | Performed by: FAMILY MEDICINE

## 2024-08-08 PROCEDURE — 84153 ASSAY OF PSA TOTAL: CPT

## 2024-08-08 PROCEDURE — 3078F DIAST BP <80 MM HG: CPT | Performed by: FAMILY MEDICINE

## 2024-08-08 PROCEDURE — 3074F SYST BP LT 130 MM HG: CPT | Performed by: FAMILY MEDICINE

## 2024-08-08 PROCEDURE — 99213 OFFICE O/P EST LOW 20 MIN: CPT | Performed by: FAMILY MEDICINE

## 2024-08-08 RX ORDER — PREDNISONE 10 MG/1
TABLET ORAL
Qty: 14 TABLET | Refills: 0 | Status: SHIPPED | OUTPATIENT
Start: 2024-08-08 | End: 2024-08-15

## 2024-08-08 RX ORDER — DOXYCYCLINE 100 MG/1
100 TABLET ORAL 2 TIMES DAILY
Qty: 20 TABLET | Refills: 0 | Status: SHIPPED | OUTPATIENT
Start: 2024-08-08 | End: 2024-08-18

## 2024-08-08 ASSESSMENT — ENCOUNTER SYMPTOMS
ABDOMINAL DISTENTION: 0
COUGH: 1
WEAKNESS: 0
NAUSEA: 0
FEVER: 0
EYE PAIN: 0
SINUS PAIN: 0
VOMITING: 0
DYSURIA: 0
POLYDIPSIA: 0
FATIGUE: 0
HEADACHES: 0
SHORTNESS OF BREATH: 1
NUMBNESS: 0
TROUBLE SWALLOWING: 0
BACK PAIN: 0
DIZZINESS: 0
SORE THROAT: 0
EYE REDNESS: 0
ABDOMINAL PAIN: 0

## 2024-08-08 NOTE — TELEPHONE ENCOUNTER
PC from pt. [facility] is still closed due to power outage requesting his scripts be sent to [facility]. PC to [facility] left VM calling in Doxycycline and the Prednisone as written per Dr. Rosado today.

## 2024-08-08 NOTE — PROGRESS NOTES
Subjective   Patient ID: Artur Rosa is a 83 y.o. male who presents for Illness (Onset 8/3/24 with nasal congestion started Zyrtec is wheezing a little, but not coughing head felt very heavy no energy, nasal discharge is yellow, denies earaches HA ST or fever. ).    Patient states that he has been coughing up yellow sputum. He notes some mild SOB. Has been taking Zyrtec at home and believes it is improving his symptoms. Pt denies any HA, dizziness, abd pain, N/V/D, CP, fever, LE swelling    Illness  Associated symptoms include congestion, coughing and shortness of breath. Pertinent negatives include no eye pain, eye redness, headaches, sore throat, fatigue, fever, chest pain, abdominal pain, nausea, vomiting or rash.        Review of Systems   Constitutional:  Negative for fatigue and fever.   HENT:  Positive for congestion. Negative for sinus pain, sore throat and trouble swallowing.    Eyes:  Negative for pain, redness and visual disturbance.   Respiratory:  Positive for cough and shortness of breath.    Cardiovascular:  Negative for chest pain and leg swelling.   Gastrointestinal:  Negative for abdominal distention, abdominal pain, nausea and vomiting.   Endocrine: Negative for polydipsia and polyuria.   Genitourinary:  Negative for dysuria.   Musculoskeletal:  Negative for back pain.   Skin:  Negative for rash.   Neurological:  Negative for dizziness, syncope, weakness, numbness and headaches.   Psychiatric/Behavioral:  Negative for behavioral problems.        Objective   /60   Pulse 75   Temp 36.6 °C (97.9 °F)   Wt 117 kg (257 lb)   SpO2 95%   BMI 32.98 kg/m²     Physical Exam  Constitutional:       Appearance: Normal appearance. He is normal weight.   HENT:      Head: Normocephalic.      Right Ear: External ear normal.      Left Ear: External ear normal.      Nose: Nose normal.      Mouth/Throat:      Mouth: Mucous membranes are moist.   Eyes:      General: No scleral icterus.     Extraocular  Movements: Extraocular movements intact.   Cardiovascular:      Rate and Rhythm: Normal rate and regular rhythm.      Pulses: Normal pulses.      Heart sounds: Normal heart sounds.   Pulmonary:      Effort: Pulmonary effort is normal.      Breath sounds: Normal breath sounds. No stridor. No wheezing or rhonchi.   Chest:      Chest wall: No tenderness.   Abdominal:      General: Abdomen is flat.      Palpations: Abdomen is soft.   Musculoskeletal:         General: Normal range of motion.      Cervical back: Normal range of motion and neck supple.   Skin:     General: Skin is warm and dry.   Neurological:      General: No focal deficit present.      Mental Status: He is alert and oriented to person, place, and time.   Psychiatric:         Mood and Affect: Mood normal.         Assessment/Plan   Diagnoses and all orders for this visit:  Acute maxillary sinusitis, recurrence not specified  -     doxycycline (Adoxa) 100 mg tablet; Take 1 tablet (100 mg) by mouth 2 times a day for 10 days. Take with a full glass of water and do not lie down for at least 30 minutes after  -     predniSONE (Deltasone) 10 mg tablet; Take 1 tablet (10 mg) by mouth 3 times a day for 2 days, THEN 1 tablet (10 mg) 2 times a day for 2 days, THEN 1 tablet (10 mg) once daily for 4 days.  Benign essential hypertension  Treated and controlled  Risk benefits discussed and add medication as directed  Supportive care  RTC 1 week if not better sooner if worse

## 2024-08-09 ENCOUNTER — APPOINTMENT (OUTPATIENT)
Dept: PRIMARY CARE | Facility: CLINIC | Age: 84
End: 2024-08-09
Payer: MEDICARE

## 2024-08-09 DIAGNOSIS — J45.40 MODERATE PERSISTENT ASTHMA, UNCOMPLICATED (HHS-HCC): ICD-10-CM

## 2024-08-09 LAB — PSA SERPL-MCNC: 7.57 NG/ML

## 2024-08-09 RX ORDER — BUDESONIDE AND FORMOTEROL FUMARATE DIHYDRATE 80; 4.5 UG/1; UG/1
AEROSOL RESPIRATORY (INHALATION)
Qty: 10.2 G | Refills: 3 | Status: SHIPPED | OUTPATIENT
Start: 2024-08-09

## 2024-08-14 ENCOUNTER — APPOINTMENT (OUTPATIENT)
Dept: UROLOGY | Facility: CLINIC | Age: 84
End: 2024-08-14
Payer: MEDICARE

## 2024-08-14 DIAGNOSIS — R39.11 BENIGN PROSTATIC HYPERPLASIA WITH URINARY HESITANCY: ICD-10-CM

## 2024-08-14 DIAGNOSIS — C61 MALIGNANT NEOPLASM OF PROSTATE (MULTI): ICD-10-CM

## 2024-08-14 DIAGNOSIS — N40.1 BENIGN PROSTATIC HYPERPLASIA WITH URINARY HESITANCY: ICD-10-CM

## 2024-08-14 DIAGNOSIS — R35.0 BENIGN PROSTATIC HYPERPLASIA WITH URINARY FREQUENCY: Primary | ICD-10-CM

## 2024-08-14 DIAGNOSIS — N40.1 BENIGN PROSTATIC HYPERPLASIA WITH URINARY FREQUENCY: Primary | ICD-10-CM

## 2024-08-14 PROCEDURE — 99214 OFFICE O/P EST MOD 30 MIN: CPT | Performed by: STUDENT IN AN ORGANIZED HEALTH CARE EDUCATION/TRAINING PROGRAM

## 2024-08-14 PROCEDURE — G2211 COMPLEX E/M VISIT ADD ON: HCPCS | Performed by: STUDENT IN AN ORGANIZED HEALTH CARE EDUCATION/TRAINING PROGRAM

## 2024-08-14 NOTE — PROGRESS NOTES
Subjective   Patient ID: Artur Rosa is a 83 y.o. male.    HPI  84 yo male on active surveillance for GG1 prostatic adenocarcinoma, Ryan Score 3+3=6. PSA now at 7.5.      He is on double dose tamsulosin. Doing well on this, he is sleeping through the night. Comfortable with sxs management, no side effects. Some frequency during the day.     Lab Results   Component Value Date    PSA 7.57 (H) 08/08/2024    PSA 5.79 (H) 03/04/2024    PSA 6.42 (H) 09/05/2023    PSA 6.7 (H) 03/10/2023    PSA 4.97 (H) 09/06/2022    PSA 7.1 (H) 06/29/2022    PSA 5.8 (H) 03/07/2022    PSA 6.12 (H) 11/09/2021    PSA 4.5 (H) 05/11/2018     MR prostate with graciela boundaries if pirads 3 or above  Result Date: 8/6/2024    1.  PI-RADS 5 lesion in the left mid peripheral zone with capsular irregularity concerning for extracapsular extension, similar in size compared to prior prostate MRI on 03/23/2022. 2. PI-RADS 4 lesion in the right anterior peripheral zone zone without evidence of extracapsular extension, similar to prior exam.   PI-RADS 5 - Very high (clinically significant cancer is highly likely to be present).         03/14/23 MRI prostate:  1. Prostate weight is estimated at 30.14g. PSA density is 0.22 ng/mL/g.  2. Previously seen PI-RADS 5 lesion in the left mid prostatic  peripheral zone has decreased in conspicuity. There is irregularity  of the left posterolateral prostatic capsule concerning for  extracapsular invasion.  3. PI-RADS 4 lesion in the right mid prostatic anterior transition  zone without evidence of extracapsular extension, similar to prior  exam.    Review of Systems    Objective   Physical Exam    Assessment/Plan   84 yo male on active surveillance for GG1 prostatic adenocarcinoma, Ryan Score 3+3=6. PSA now at 7.5.      He is on double dose tamsulosin. Doing well on this, he is sleeping through the night. Comfortable with sxs management, no side effects. Some frequency during the day.     I personally reviewed  the MRI images as well as report. PI-RADS 5 lesion in the left mid peripheral zone with capsular irregularity concerning for extracapsular extension, similar in size compared to prior prostate MRI on 03/23/2022. PI-RADS 4 lesion in the right anterior peripheral zone zone without evidence of extracapsular extension, similar to prior exam. PI-RADS 5.     We discussed for now, it is OK to continue PSA surveillance. There is no evidence this cancer is progressing fast, and due to comorbidity and age, more compelling evidence is needed (like a significant jump in the PSA) to be comfortable with proceeding with biopsy. Will repeat PSA in December 2024.     Plan:  PSA December 2024.   FUV December 2024 after PSA.   Continue tamsulosin 0.4  mg 2 pills daily at bedtime.     Diagnoses and all orders for this visit:  Benign prostatic hyperplasia with urinary frequency  Malignant neoplasm of prostate (Multi)  -     PSA; Future  Benign prostatic hyperplasia with urinary hesitancy  -     tamsulosin (Flomax) 0.4 mg 24 hr capsule; Take 2 capsules (0.8 mg) by mouth once daily.    Scribe Attestation  By signing my name below, IRajni, Scribe attest that this documentation has been prepared under the direction and in the presence of Vicki Howard MD.

## 2024-08-15 RX ORDER — TAMSULOSIN HYDROCHLORIDE 0.4 MG/1
0.8 CAPSULE ORAL DAILY
Qty: 60 CAPSULE | Refills: 11 | Status: SHIPPED | OUTPATIENT
Start: 2024-08-15 | End: 2025-08-15

## 2024-08-23 DIAGNOSIS — G25.0 ESSENTIAL TREMOR: ICD-10-CM

## 2024-08-23 RX ORDER — PRIMIDONE 50 MG/1
50 TABLET ORAL 2 TIMES DAILY
Qty: 180 TABLET | Refills: 1 | Status: SHIPPED | OUTPATIENT
Start: 2024-08-23

## 2024-09-16 ENCOUNTER — APPOINTMENT (OUTPATIENT)
Dept: UROLOGY | Facility: CLINIC | Age: 84
End: 2024-09-16
Payer: MEDICARE

## 2024-11-14 ENCOUNTER — LAB (OUTPATIENT)
Dept: LAB | Facility: LAB | Age: 84
End: 2024-11-14
Payer: MEDICARE

## 2024-11-14 ENCOUNTER — APPOINTMENT (OUTPATIENT)
Dept: NEUROLOGY | Facility: CLINIC | Age: 84
End: 2024-11-14
Payer: MEDICARE

## 2024-11-14 VITALS
SYSTOLIC BLOOD PRESSURE: 138 MMHG | HEIGHT: 74 IN | BODY MASS INDEX: 33.37 KG/M2 | WEIGHT: 260 LBS | DIASTOLIC BLOOD PRESSURE: 72 MMHG | HEART RATE: 67 BPM

## 2024-11-14 DIAGNOSIS — G25.0 BENIGN ESSENTIAL TREMOR: Primary | ICD-10-CM

## 2024-11-14 DIAGNOSIS — C61 MALIGNANT NEOPLASM OF PROSTATE (MULTI): ICD-10-CM

## 2024-11-14 PROCEDURE — 99212 OFFICE O/P EST SF 10 MIN: CPT | Performed by: PSYCHIATRY & NEUROLOGY

## 2024-11-14 PROCEDURE — 1036F TOBACCO NON-USER: CPT | Performed by: PSYCHIATRY & NEUROLOGY

## 2024-11-14 PROCEDURE — 36415 COLL VENOUS BLD VENIPUNCTURE: CPT

## 2024-11-14 PROCEDURE — 3078F DIAST BP <80 MM HG: CPT | Performed by: PSYCHIATRY & NEUROLOGY

## 2024-11-14 PROCEDURE — 84153 ASSAY OF PSA TOTAL: CPT

## 2024-11-14 PROCEDURE — 3075F SYST BP GE 130 - 139MM HG: CPT | Performed by: PSYCHIATRY & NEUROLOGY

## 2024-11-14 PROCEDURE — 1159F MED LIST DOCD IN RCRD: CPT | Performed by: PSYCHIATRY & NEUROLOGY

## 2024-11-14 NOTE — PROGRESS NOTES
Subjective   Artur Rosa is a 84 y.o.   male.  HPI  This is a 84 year old man, with his son, present, here for BET, last seen in 2/19/2024.  He has stable right sided BET.  Objective   Neurological Exam  Right arm is quite steady in the outstretched position.  Gait is normal, speech is normal.  Physical Exam  I personally reviewed laboratory, radiographic, and medical studies which were pertinent for nothing.    Assessment/Plan

## 2024-11-14 NOTE — PATIENT INSTRUCTIONS
You are stable on the primidone 50 mg twice a day, continue monitoring it and call the office if getting worse.  I do not think changing the dosing or try another medication at this time.  Follow up in one year.

## 2024-11-15 LAB — PSA SERPL-MCNC: 7.44 NG/ML

## 2024-11-16 ENCOUNTER — TELEPHONE (OUTPATIENT)
Dept: PRIMARY CARE | Facility: CLINIC | Age: 84
End: 2024-11-16
Payer: MEDICARE

## 2024-11-16 DIAGNOSIS — J44.1 COPD EXACERBATION (MULTI): ICD-10-CM

## 2024-11-16 RX ORDER — ALBUTEROL SULFATE 90 UG/1
2 INHALANT RESPIRATORY (INHALATION) EVERY 4 HOURS PRN
Qty: 18 G | Refills: 0 | Status: SHIPPED | OUTPATIENT
Start: 2024-11-16 | End: 2025-02-14

## 2024-11-16 NOTE — TELEPHONE ENCOUNTER
MEDICATION REFILL    Pharmacy Name:   [facility] [address]  Medication requested            Albuterol     90 mcg/actuation inhaler  Dosage    2 puffs every 4 hours prn   Quantity           90 days    Allergies      none given  Date of last visit   08/08/2024  Date of Next Appointment [  ]

## 2024-12-07 DIAGNOSIS — J44.1 COPD EXACERBATION (MULTI): ICD-10-CM

## 2024-12-10 NOTE — PROGRESS NOTES
Subjective   Patient ID: Artur Rosa is a 84 y.o. male. He is prescribed tamsulosin 0.4 mg 2 pills at bedtime.       HPI  84 y.o. male on active surveillance for GG1 prostatic adenocarcinoma, [place] Score 3+3=6. Recent PSA was 7.44, decreased slightly from 7.57.    He is not experiencing increased urinary symptoms at this time.     Lab Results   Component Value Date    PSA 7.44 (H) 11/14/2024    PSA 7.57 (H) 08/08/2024    PSA 5.79 (H) 03/04/2024    PSA 6.42 (H) 09/05/2023    PSA 6.7 (H) 03/10/2023      MR PROSTATE WITH LAYTON BOUNDARIES IF PIRADS 3 OR ABOVE; 8/5/2024   IMPRESSION:  1.  PI-RADS 5 lesion in the left mid peripheral zone with capsular  irregularity concerning for extracapsular extension, similar in size  compared to prior prostate MRI on 03/23/2022.  2. PI-RADS 4 lesion in the right anterior peripheral zone zone  without evidence of extracapsular extension, similar to prior exam.      Review of Systems  A complete review of systems was performed. All systems are noted to be negative unless indicated in the history of present illness, impression, active problem list, or past histories.     Objective   Physical Exam    Assessment/Plan   Diagnoses and all orders for this visit:  Malignant neoplasm of prostate (Multi)  -     Prostate Specific Antigen; Future  Benign prostatic hyperplasia with urinary frequency      84 y.o. male on active surveillance for GG1 prostatic adenocarcinoma, Ryan Score 3+3=6. Recent PSA was 7.44, decreased slightly from 7.57. He is prescribed tamsulosin 0.4 mg 2 pills at bedtime.     I personally reviewed the MRI of the prostate which demonstrates 2 separate lesions.     His cancer is not that active, as the PSA has not doubled in one year. It is a concern that if we treat the patient that he would have great effects from the treatment. The patient is currently not experiencing side effects of treatment because he is not undergoing treatment. I expressed that the treatment  options depend on the PSA level and symptoms.     We are in stable condition. We will continue with active surveillance. The patient will be monitored every 6 months with PSA testing and follow up visits.     Plan:  Continue tamsulosin 0.4  mg 2 pills daily at bedtime.   PSA 05/2025  FUV 06/2025      Scribe Attestation   By signing my name below, IIsidoro, Scribe attestation that this documentation has been prepared under the direction and in the presence of Vicki Howard MD.

## 2024-12-10 NOTE — TELEPHONE ENCOUNTER
Patient did not wish to make an appointment at this time            [General Appearance - Well Developed] : well developed [Well Groomed] : well groomed [Normal Appearance] : normal appearance [No Deformities] : no deformities [General Appearance - Well Nourished] : well nourished [General Appearance - In No Acute Distress] : no acute distress [Normal Conjunctiva] : the conjunctiva exhibited no abnormalities [Eyelids - No Xanthelasma] : the eyelids demonstrated no xanthelasmas [Normal Oropharynx] : normal oropharynx [Neck Appearance] : the appearance of the neck was normal [Neck Cervical Mass (___cm)] : no neck mass was observed [Jugular Venous Distention Increased] : there was no jugular-venous distention [Thyroid Diffuse Enlargement] : the thyroid was not enlarged [Murmurs] : no murmurs present [Thyroid Nodule] : there were no palpable thyroid nodules [Heart Sounds] : normal S1 and S2 [Heart Rate And Rhythm] : heart rate and rhythm were normal [Respiration, Rhythm And Depth] : normal respiratory rhythm and effort [Exaggerated Use Of Accessory Muscles For Inspiration] : no accessory muscle use [Surgical scars] : surgical scars [Kyphosis] : kyphosis [Abdomen Soft] : soft [Abdomen Tenderness] : non-tender [Abdomen Mass (___ Cm)] : no abdominal mass palpated [Gait - Sufficient For Exercise Testing] : the gait was sufficient for exercise testing [Abnormal Walk] : normal gait [Cyanosis, Localized] : no localized cyanosis [Petechial Hemorrhages (___cm)] : no petechial hemorrhages [Nail Clubbing] : no clubbing of the fingernails [No Skin Ulcers] : no skin ulcer [Skin Lesions] : no skin lesions [No Xanthoma] : no  xanthoma was observed [No Venous Stasis] : no venous stasis [No Focal Deficits] : no focal deficits [Deep Tendon Reflexes (DTR)] : deep tendon reflexes were 2+ and symmetric [Sensation] : the sensory exam was normal to light touch and pinprick [Skin Color & Pigmentation] : normal skin color and pigmentation [] : no rash [Skin Turgor] : normal skin turgor [FreeTextEntry1] : scattered wheezes bilaterally

## 2024-12-11 ENCOUNTER — APPOINTMENT (OUTPATIENT)
Dept: UROLOGY | Facility: CLINIC | Age: 84
End: 2024-12-11
Payer: MEDICARE

## 2024-12-11 DIAGNOSIS — N40.1 BENIGN PROSTATIC HYPERPLASIA WITH URINARY FREQUENCY: ICD-10-CM

## 2024-12-11 DIAGNOSIS — R35.0 BENIGN PROSTATIC HYPERPLASIA WITH URINARY FREQUENCY: ICD-10-CM

## 2024-12-11 DIAGNOSIS — C61 MALIGNANT NEOPLASM OF PROSTATE (MULTI): Primary | ICD-10-CM

## 2024-12-11 PROCEDURE — 99213 OFFICE O/P EST LOW 20 MIN: CPT | Performed by: STUDENT IN AN ORGANIZED HEALTH CARE EDUCATION/TRAINING PROGRAM

## 2024-12-11 PROCEDURE — G2211 COMPLEX E/M VISIT ADD ON: HCPCS | Performed by: STUDENT IN AN ORGANIZED HEALTH CARE EDUCATION/TRAINING PROGRAM

## 2024-12-21 DIAGNOSIS — I48.0 PAROXYSMAL ATRIAL FIBRILLATION (MULTI): ICD-10-CM

## 2024-12-23 RX ORDER — DOFETILIDE 0.25 MG/1
CAPSULE ORAL 2 TIMES DAILY
Qty: 180 CAPSULE | Refills: 1 | Status: SHIPPED | OUTPATIENT
Start: 2024-12-23

## 2025-01-08 RX ORDER — ALBUTEROL SULFATE 90 UG/1
2 INHALANT RESPIRATORY (INHALATION) EVERY 4 HOURS PRN
Qty: 8 G | Refills: 0 | Status: SHIPPED | OUTPATIENT
Start: 2025-01-08 | End: 2025-04-08

## 2025-02-17 ENCOUNTER — OFFICE VISIT (OUTPATIENT)
Dept: PRIMARY CARE | Facility: CLINIC | Age: 85
End: 2025-02-17
Payer: MEDICARE

## 2025-02-17 VITALS
WEIGHT: 255.6 LBS | OXYGEN SATURATION: 99 % | SYSTOLIC BLOOD PRESSURE: 152 MMHG | TEMPERATURE: 98.1 F | BODY MASS INDEX: 32.82 KG/M2 | HEART RATE: 59 BPM | DIASTOLIC BLOOD PRESSURE: 58 MMHG

## 2025-02-17 DIAGNOSIS — D03.30 MELANOMA IN SITU OF FACE (MULTI): ICD-10-CM

## 2025-02-17 DIAGNOSIS — I48.91 ATRIAL FIBRILLATION, UNSPECIFIED TYPE (MULTI): ICD-10-CM

## 2025-02-17 DIAGNOSIS — K57.32 DIVERTICULITIS OF LARGE INTESTINE WITHOUT PERFORATION OR ABSCESS WITHOUT BLEEDING: Primary | ICD-10-CM

## 2025-02-17 PROBLEM — J44.1 COPD EXACERBATION (MULTI): Status: RESOLVED | Noted: 2023-09-27 | Resolved: 2025-02-17

## 2025-02-17 PROCEDURE — 3078F DIAST BP <80 MM HG: CPT | Performed by: FAMILY MEDICINE

## 2025-02-17 PROCEDURE — G2211 COMPLEX E/M VISIT ADD ON: HCPCS | Performed by: FAMILY MEDICINE

## 2025-02-17 PROCEDURE — 1036F TOBACCO NON-USER: CPT | Performed by: FAMILY MEDICINE

## 2025-02-17 PROCEDURE — 3077F SYST BP >= 140 MM HG: CPT | Performed by: FAMILY MEDICINE

## 2025-02-17 PROCEDURE — 1159F MED LIST DOCD IN RCRD: CPT | Performed by: FAMILY MEDICINE

## 2025-02-17 PROCEDURE — 99213 OFFICE O/P EST LOW 20 MIN: CPT | Performed by: FAMILY MEDICINE

## 2025-02-17 RX ORDER — AMOXICILLIN AND CLAVULANATE POTASSIUM 875; 125 MG/1; MG/1
875 TABLET, FILM COATED ORAL 2 TIMES DAILY
Qty: 14 TABLET | Refills: 0 | Status: SHIPPED | OUTPATIENT
Start: 2025-02-17 | End: 2025-02-24

## 2025-02-17 ASSESSMENT — ENCOUNTER SYMPTOMS
UNEXPECTED WEIGHT CHANGE: 0
VOMITING: 0
CONSTIPATION: 0
PALPITATIONS: 0

## 2025-02-17 ASSESSMENT — PATIENT HEALTH QUESTIONNAIRE - PHQ9
1. LITTLE INTEREST OR PLEASURE IN DOING THINGS: NOT AT ALL
SUM OF ALL RESPONSES TO PHQ9 QUESTIONS 1 AND 2: 0
2. FEELING DOWN, DEPRESSED OR HOPELESS: NOT AT ALL

## 2025-02-17 NOTE — PROGRESS NOTES
Subjective   Patient ID: Artur Rosa is a 84 y.o. male who presents for stomach upset and diarrhea (Pt has had diarrhea since 2/10/25 and upset stomach.  Every time he eats is having diarrhea.  Not going a much because he isn't eating as much.  Pt tried imodium and it may have helped some.  Pt is wearing depends because it is hard to get to the bathroom.  Pt just returned from [place] on 2/11/25.  ).    HPI   Complains of lower abdominal pain  No fever chills  Watery stool  Did not eat or drink anything he thinks is suspicious  Wife is not sick  Does have known history of diverticulosis    Review of Systems   Constitutional:  Negative for unexpected weight change.   HENT:  Negative for congestion and ear discharge.    Cardiovascular:  Negative for chest pain and palpitations.   Gastrointestinal:  Negative for constipation and vomiting.       Objective   /58   Pulse 59   Temp 36.7 °C (98.1 °F) (Temporal)   Wt 116 kg (255 lb 9.6 oz)   SpO2 99%   BMI 32.82 kg/m²     Physical Exam  HENT:      Head: Normocephalic and atraumatic.      Nose: Nose normal.      Mouth/Throat:      Mouth: Mucous membranes are moist.      Pharynx: No oropharyngeal exudate.   Eyes:      Extraocular Movements: Extraocular movements intact.      Conjunctiva/sclera: Conjunctivae normal.      Pupils: Pupils are equal, round, and reactive to light.   Cardiovascular:      Rate and Rhythm: Normal rate and regular rhythm.   Pulmonary:      Effort: Pulmonary effort is normal.      Breath sounds: Normal breath sounds.   Abdominal:      General: Bowel sounds are normal. There is no distension.      Palpations: Abdomen is soft.          Comments: Sl tenderness suprapubic area   Musculoskeletal:      Cervical back: Normal range of motion and neck supple.   Lymphadenopathy:      Cervical: No cervical adenopathy.   Neurological:      General: No focal deficit present.      Mental Status: He is alert.   Psychiatric:         Attention and  Perception: Attention normal.         Speech: Speech normal.         Behavior: Behavior is cooperative.         Assessment/Plan   Problem List Items Addressed This Visit    None  Visit Diagnoses         Codes    Diverticulitis of large intestine without perforation or abscess without bleeding    -  Primary K57.32          Risk benefits discussed and add medication as directed  Discussed clear liquid diet  Consider stool studies  Consider CT abdomen/pelvis  Recheck 1 week if not better sooner if worse

## 2025-03-05 ENCOUNTER — TELEPHONE (OUTPATIENT)
Dept: PRIMARY CARE | Facility: CLINIC | Age: 85
End: 2025-03-05
Payer: MEDICARE

## 2025-03-05 DIAGNOSIS — K57.32 DIVERTICULITIS OF LARGE INTESTINE WITHOUT PERFORATION OR ABSCESS WITHOUT BLEEDING: Primary | ICD-10-CM

## 2025-03-05 RX ORDER — AMOXICILLIN AND CLAVULANATE POTASSIUM 875; 125 MG/1; MG/1
875 TABLET, FILM COATED ORAL 2 TIMES DAILY
Qty: 20 TABLET | Refills: 0 | Status: SHIPPED | OUTPATIENT
Start: 2025-03-05 | End: 2025-03-15

## 2025-03-05 NOTE — TELEPHONE ENCOUNTER
Patient was seen on 2/17 for some stomach/ diarrhea issues. He was prescribed amoxicillin which he states helped. He is asking if he can do one more round. Please advise.

## 2025-03-22 DIAGNOSIS — R39.11 BENIGN PROSTATIC HYPERPLASIA WITH URINARY HESITANCY: ICD-10-CM

## 2025-03-22 DIAGNOSIS — J44.1 COPD EXACERBATION (MULTI): ICD-10-CM

## 2025-03-22 DIAGNOSIS — N40.1 BENIGN PROSTATIC HYPERPLASIA WITH URINARY HESITANCY: ICD-10-CM

## 2025-03-24 DIAGNOSIS — G25.0 ESSENTIAL TREMOR: ICD-10-CM

## 2025-03-24 RX ORDER — TAMSULOSIN HYDROCHLORIDE 0.4 MG/1
0.4 CAPSULE ORAL DAILY
Qty: 90 CAPSULE | Refills: 3 | Status: SHIPPED | OUTPATIENT
Start: 2025-03-24

## 2025-03-24 RX ORDER — PRIMIDONE 50 MG/1
50 TABLET ORAL 2 TIMES DAILY
Qty: 180 TABLET | Refills: 1 | Status: SHIPPED | OUTPATIENT
Start: 2025-03-24

## 2025-03-24 RX ORDER — ALBUTEROL SULFATE 90 UG/1
2 INHALANT RESPIRATORY (INHALATION) EVERY 4 HOURS PRN
Qty: 6.7 G | Refills: 0 | Status: SHIPPED | OUTPATIENT
Start: 2025-03-24 | End: 2025-06-22

## 2025-05-18 DIAGNOSIS — J45.40 MODERATE PERSISTENT ASTHMA, UNCOMPLICATED (HHS-HCC): ICD-10-CM

## 2025-05-19 RX ORDER — BUDESONIDE AND FORMOTEROL FUMARATE DIHYDRATE 80; 4.5 UG/1; UG/1
2 AEROSOL RESPIRATORY (INHALATION) 2 TIMES DAILY
Qty: 6.9 G | Refills: 1 | Status: SHIPPED | OUTPATIENT
Start: 2025-05-19

## 2025-06-09 ENCOUNTER — TELEPHONE (OUTPATIENT)
Dept: UROLOGY | Facility: CLINIC | Age: 85
End: 2025-06-09
Payer: MEDICARE

## 2025-06-09 NOTE — TELEPHONE ENCOUNTER
Spoke with patient to inform him that there is an active order for PSA in the system for him. He can stop at the lab and have this done whenever he is able- no need for physical copy of the order. Patient is aware and will have his labs done today.

## 2025-06-11 ENCOUNTER — APPOINTMENT (OUTPATIENT)
Dept: UROLOGY | Facility: CLINIC | Age: 85
End: 2025-06-11
Payer: MEDICARE

## 2025-06-13 LAB — PSA SERPL-MCNC: 7.92 NG/ML

## 2025-07-11 ENCOUNTER — APPOINTMENT (OUTPATIENT)
Dept: UROLOGY | Facility: CLINIC | Age: 85
End: 2025-07-11
Payer: MEDICARE

## 2025-07-22 NOTE — PROGRESS NOTES
Subjective   Patient ID: Artur Rosa is a 84 y.o. male.      HPI  84 y.o. male presents for a follow up visit regarding active surveillance of a prostatic adenocarcinoma Amelia score 3+3=6, GG1. Recent PSA level 7.92.     He is prescribed tamsulosin 0.4 mg 2 pills at bedtime. He is not experiencing increased urinary symptoms at this time. He indicates that he has good urinary flow.     Lab Results   Component Value Date    PSA 7.92 (H) 06/12/2025    PSA 7.44 (H) 11/14/2024    PSA 7.57 (H) 08/08/2024    PSA 5.79 (H) 03/04/2024    PSA 6.42 (H) 09/05/2023         Review of Systems  A complete review of systems was performed. All systems are noted to be negative unless indicated in the history of present illness, impression, active problem list, or past histories.     Objective   Physical Exam    Assessment/Plan   Diagnoses and all orders for this visit:  Benign prostatic hyperplasia with urinary frequency  Prostate cancer (Multi)  -     Prostate Specific Antigen; Future  Benign prostatic hyperplasia with urinary hesitancy  -     tamsulosin (Flomax) 0.4 mg 24 hr capsule; Take 1 capsule (0.4 mg) by mouth once daily.      84 y.o. male presents for a follow up visit regarding active surveillance of a prostatic adenocarcinoma Ryan score 3+3=6, GG1. Recent PSA level 7.92.     I personally reviewed the PSA level that is currently 7.92. This has elevated slightly from 7.44 in 7 months. It is relatively comparable to the last value. I reviewed the patient's PSA level trend, which is stable or elevating mildly.     I personally reviewed the MRI of the prostate that was obtained on 08/2024. The impression of the report was stable compared to the previous imaging in 10/17/2022.     The patient is currently on active surveillance. Active surveillance is typically reserved for low-risk GG1 prostate cancer as well as very low volume GG2 if patient is comfortable. This will include serial PSA's, physical exam including KRISTOPHER,  serial MRI every 12-15 months or if PSA significantly rises, and repeat biopsies if indicated because of any worsening parameter.    I do not think there is value in obtaining another prostate biopsy is indicated at this time. We are able to obtain a repeat MRI of the prostate in 1 year. The patient's wife has requested a repeat MRI of the prostate in 6 months.     I have advised the patient that his prostate cancer will likely not become fatal. It is likely that he will have a different cause of fatality.      The patient returns today with improved lower urinary tract symptoms while on tamsulosin 0.4 mg 1 pill daily at bedtime. As he is tolerating the medication well, we will continue at this time. I will refill the patient's medication for 1 year.       Plan:  Refill tamsulosin 0.4 mg 2 pills daily at bedtime.  PSA 12/2025  FUV with urology in 01/2026    Scribe Attestation   By signing my name below, IIsidoro, Huey attestation that this documentation has been prepared under the direction and in the presence of Vicki Howard MD.

## 2025-07-23 ENCOUNTER — APPOINTMENT (OUTPATIENT)
Dept: UROLOGY | Facility: CLINIC | Age: 85
End: 2025-07-23
Payer: MEDICARE

## 2025-07-23 DIAGNOSIS — C61 PROSTATE CANCER (MULTI): ICD-10-CM

## 2025-07-23 DIAGNOSIS — N40.1 BENIGN PROSTATIC HYPERPLASIA WITH URINARY HESITANCY: ICD-10-CM

## 2025-07-23 DIAGNOSIS — R35.0 BENIGN PROSTATIC HYPERPLASIA WITH URINARY FREQUENCY: Primary | ICD-10-CM

## 2025-07-23 DIAGNOSIS — R39.11 BENIGN PROSTATIC HYPERPLASIA WITH URINARY HESITANCY: ICD-10-CM

## 2025-07-23 DIAGNOSIS — N40.1 BENIGN PROSTATIC HYPERPLASIA WITH URINARY FREQUENCY: Primary | ICD-10-CM

## 2025-07-23 PROCEDURE — 1159F MED LIST DOCD IN RCRD: CPT | Performed by: STUDENT IN AN ORGANIZED HEALTH CARE EDUCATION/TRAINING PROGRAM

## 2025-07-23 PROCEDURE — 99214 OFFICE O/P EST MOD 30 MIN: CPT | Performed by: STUDENT IN AN ORGANIZED HEALTH CARE EDUCATION/TRAINING PROGRAM

## 2025-07-23 PROCEDURE — G2211 COMPLEX E/M VISIT ADD ON: HCPCS | Performed by: STUDENT IN AN ORGANIZED HEALTH CARE EDUCATION/TRAINING PROGRAM

## 2025-07-23 PROCEDURE — 1126F AMNT PAIN NOTED NONE PRSNT: CPT | Performed by: STUDENT IN AN ORGANIZED HEALTH CARE EDUCATION/TRAINING PROGRAM

## 2025-07-23 RX ORDER — TAMSULOSIN HYDROCHLORIDE 0.4 MG/1
0.4 CAPSULE ORAL DAILY
Qty: 90 CAPSULE | Refills: 3 | Status: SHIPPED | OUTPATIENT
Start: 2025-07-23

## 2025-07-23 ASSESSMENT — PAIN SCALES - GENERAL: PAINLEVEL_OUTOF10: 0-NO PAIN

## 2025-09-01 DIAGNOSIS — I48.0 PAROXYSMAL ATRIAL FIBRILLATION (MULTI): ICD-10-CM

## 2025-09-02 RX ORDER — DOFETILIDE 0.25 MG/1
250 CAPSULE ORAL 2 TIMES DAILY
Qty: 180 CAPSULE | Refills: 0 | Status: SHIPPED | OUTPATIENT
Start: 2025-09-02

## 2025-11-12 ENCOUNTER — APPOINTMENT (OUTPATIENT)
Dept: NEUROLOGY | Facility: CLINIC | Age: 85
End: 2025-11-12
Payer: MEDICARE